# Patient Record
Sex: FEMALE | Race: BLACK OR AFRICAN AMERICAN | Employment: UNEMPLOYED | URBAN - METROPOLITAN AREA
[De-identification: names, ages, dates, MRNs, and addresses within clinical notes are randomized per-mention and may not be internally consistent; named-entity substitution may affect disease eponyms.]

---
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
## 2022-08-05 ENCOUNTER — OFFICE VISIT (OUTPATIENT)
Dept: OBGYN CLINIC | Age: 22
End: 2022-08-05
Payer: COMMERCIAL

## 2022-08-05 VITALS
DIASTOLIC BLOOD PRESSURE: 84 MMHG | SYSTOLIC BLOOD PRESSURE: 124 MMHG | HEIGHT: 66 IN | BODY MASS INDEX: 43.07 KG/M2 | WEIGHT: 268 LBS

## 2022-08-05 DIAGNOSIS — N92.6 IRREGULAR MENSES: Primary | ICD-10-CM

## 2022-08-05 DIAGNOSIS — E28.2 PCOS (POLYCYSTIC OVARIAN SYNDROME): ICD-10-CM

## 2022-08-05 LAB
EST. AVERAGE GLUCOSE BLD GHB EST-MCNC: 111 MG/DL
HBA1C MFR BLD: 5.5 % (ref 4–5.6)
PROLACTIN SERPL-MCNC: 9.9 NG/ML
TSH SERPL DL<=0.05 MIU/L-ACNC: 1.47 UIU/ML (ref 0.36–3.74)

## 2022-08-05 PROCEDURE — 99203 OFFICE O/P NEW LOW 30 MIN: CPT | Performed by: OBSTETRICS & GYNECOLOGY

## 2022-08-05 RX ORDER — DROSPIRENONE AND ETHINYL ESTRADIOL 0.02-3(28)
1 KIT ORAL DAILY
Qty: 3 DOSE PACK | Refills: 4 | Status: SHIPPED | OUTPATIENT
Start: 2022-08-05

## 2022-08-05 NOTE — PROGRESS NOTES
New Patient Problem Visit    Mateo Marquez is a 23 y.o.  presenting for problem visit. Her main concern today is irregular menstrual bleeding. Current episode of bleeding started 3 months ago. Since that time she has had daily bleeding, ranging from spotting to heavy flow. Denies abdominal cramping, but occasionally has back pain. Also having fatigue. Prior to this episode her periods were more regular, but she sometimes would skip months. She usually attributed this to stress of school. 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 She is open to medical management to regulate her bleeding. She relays acne in the chin/cheek region, also shaves her face every other day. Had a Hgb at [facility] and was normally. Her mother, Katiana Garner, is also a patient of mine. Ob/Gyn Hx:  G0  LMP- started 3 months ago  Menses- irregular, prolonged  Contraception- none  SA- never      No past medical history on file. No past surgical history on file. No family history on file.     Social History     Socioeconomic History    Marital status: SINGLE     Spouse name: Not on file    Number of children: Not on file    Years of education: Not on file    Highest education level: Not on file   Occupational History    Not on file   Tobacco Use    Smoking status: Never    Smokeless tobacco: Never   Substance and Sexual Activity    Alcohol use: Not on file    Drug use: Not on file    Sexual activity: Not on file   Other Topics Concern    Not on file   Social History Narrative    Not on file     Social Determinants of Health     Financial Resource Strain: Not on file   Food Insecurity: Not on file   Transportation Needs: Not on file   Physical Activity: Not on file   Stress: Not on file   Social Connections: Not on file   Intimate Partner Violence: Not on file   Housing Stability: Not on file       Current Outpatient Medications   Medication Sig Dispense Refill    drospirenone-ethinyl estradioL (Shayne Staton, 30,) 3-0.02 mg tab Take 1 Tablet by mouth in the morning. 3 Dose Pack 4    loratadine (CLARITIN PO) Take  by mouth. (Patient not taking: Reported on 8/5/2022)         No Known Allergies    Review of Systems - History obtained from the patient  Constitutional: negative for weight loss, fever, night sweats  HEENT: negative for hearing loss, earache, congestion, snoring, sorethroat  CV: negative for chest pain, palpitations, edema  Resp: negative for cough, shortness of breath, wheezing  GI: negative for change in bowel habits, abdominal pain, black or bloody stools  : negative for frequency, dysuria, hematuria, vaginal discharge  MSK: negative for back pain, joint pain, muscle pain  Breast: negative for breast lumps, nipple discharge, galactorrhea  Skin :negative for itching, rash, hives  Neuro: negative for dizziness, headache, confusion, weakness  Psych: negative for anxiety, depression, change in mood  Heme/lymph: negative for bleeding, bruising, pallor    Physical Exam    Visit Vitals  /84   Ht 5' 6\" (1.676 m)   Wt 268 lb (121.6 kg)   BMI 43.26 kg/m²         OBGyn Exam      Constitutional  Appearance: obese, well developed, alert, in no acute distress    HENT  Head and Face: appears normal, hirsutism on cheek and chin and mild acne    Neck  Inspection/Palpation: normal appearance, no masses or tenderness  Thyroid: gland size normal, nontender    Chest  Respiratory Effort: non-labored breathing    Cardiovascular  Extremities: no peripheral edema    Gastrointestinal  Abdominal Examination: abdomen non-distended, non-tender to palpation  Liver and spleen: no hepatomegaly present, spleen not palpable  Hernias: no hernias identified      Skin  General Inspection: no rash, no lesions identified    Neurologic/Psychiatric  Mental Status:  Orientation: grossly oriented to person, place and time  Mood and Affect: mood normal, affect appropriate      Assessment/Plan:    1.  Irregular menses    - TESTOSTERONE, FREE+TOTAL; Future  - TSH 3RD GENERATION; Future  - PROLACTIN; Future  - DHEA SULFATE; Future  - HEMOGLOBIN A1C WITH EAG; Future  - HEMOGLOBIN A1C WITH EAG  - DHEA SULFATE  - PROLACTIN  - TSH 3RD GENERATION  - TESTOSTERONE, FREE+TOTAL    2. PCOS (polycystic ovarian syndrome)  Discussed her clinical symptoms and her gyn history. She meets 2/3 criteria for PCOS (no prior pelvic ultrasound to evaluate ovaries). We discussed checking labs and to rule out other additional causes. Discussed starting OCPs for menstrual regulation and for androgen binding. Rx Gianvi sent. - TESTOSTERONE, FREE+TOTAL; Future  - TSH 3RD GENERATION; Future  - PROLACTIN; Future  - DHEA SULFATE; Future  - HEMOGLOBIN A1C WITH EAG;  Future  - HEMOGLOBIN A1C WITH EAG  - DHEA SULFATE  - PROLACTIN  - TSH 3RD GENERATION  - TESTOSTERONE, FREE+TOTAL        Gail Shelton MD

## 2022-08-06 LAB — DHEA-S SERPL-MCNC: 272 UG/DL (ref 110–431.7)

## 2022-08-09 LAB
TESTOST FREE SERPL-MCNC: 2.6 PG/ML (ref 0–4.2)
TESTOST SERPL-MCNC: 33 NG/DL (ref 10–55)

## 2022-08-10 NOTE — PROGRESS NOTES
Patient informed of normal lab results and recommendation to use OCP for regulation of menses. Patient states she has had upset stomach/nausea few days since starting the pills. Suggested she try changing the time of day she routinely takes her pill to see if this helps.

## 2022-08-10 NOTE — PROGRESS NOTES
Please call pt and let her know that her labs are normal. This rules out other causes for her irregular periods such as thyroid disorder, and still clinically meets criteria for PCOS. Continue the plan of Srikanth JC.

## 2022-08-29 ENCOUNTER — TELEPHONE (OUTPATIENT)
Dept: OBGYN CLINIC | Age: 22
End: 2022-08-29

## 2024-08-15 RX ORDER — DROSPIRENONE AND ETHINYL ESTRADIOL 0.02-3(28)
1 KIT ORAL DAILY
Qty: 1 PACKET | Refills: 1 | Status: SHIPPED | OUTPATIENT
Start: 2024-08-15

## 2024-09-09 RX ORDER — DROSPIRENONE AND ETHINYL ESTRADIOL 0.02-3(28)
1 KIT ORAL DAILY
Qty: 84 TABLET | Refills: 1 | OUTPATIENT
Start: 2024-09-09

## 2024-09-25 ENCOUNTER — TELEPHONE (OUTPATIENT)
Age: 24
End: 2024-09-25